# Patient Record
Sex: FEMALE | Race: WHITE
[De-identification: names, ages, dates, MRNs, and addresses within clinical notes are randomized per-mention and may not be internally consistent; named-entity substitution may affect disease eponyms.]

---

## 2017-06-07 ENCOUNTER — HOSPITAL ENCOUNTER (OUTPATIENT)
Dept: HOSPITAL 7 - FB.SDS | Age: 62
Discharge: HOME | End: 2017-06-07
Attending: SURGERY
Payer: COMMERCIAL

## 2017-06-07 VITALS — SYSTOLIC BLOOD PRESSURE: 130 MMHG | DIASTOLIC BLOOD PRESSURE: 84 MMHG

## 2017-06-07 DIAGNOSIS — Z90.710: ICD-10-CM

## 2017-06-07 DIAGNOSIS — E03.9: ICD-10-CM

## 2017-06-07 DIAGNOSIS — Z87.891: ICD-10-CM

## 2017-06-07 DIAGNOSIS — Z98.890: ICD-10-CM

## 2017-06-07 DIAGNOSIS — E78.5: ICD-10-CM

## 2017-06-07 DIAGNOSIS — Z79.899: ICD-10-CM

## 2017-06-07 DIAGNOSIS — K43.9: Primary | ICD-10-CM

## 2017-06-07 PROCEDURE — 49560: CPT

## 2017-06-07 PROCEDURE — C1781 MESH (IMPLANTABLE): HCPCS

## 2017-06-07 NOTE — PCM.OPNOTE
- General Post-Op/Procedure Note


Date of Surgery/Procedure: 06/07/17


Operative Procedure(s): vental hernia repair with mesh


Findings: 





3 cm defect. 


Pre Op Diagnosis: ventral hernia without gangrene or obstruction


Post-Op Diagnosis: Same


Anesthesia Technique: General ET tube, Local (6ml 1% lido with epi/0.5% 

buvipicaine)


Primary Surgeon: Ronnie Valdez


Anesthesia Provider: Heather Holland


Pathology: 





none





Complications: None


Condition: Good


Free Text/Narrative:: 





see dictation

## 2017-06-07 NOTE — OR
DATE OF OPERATION:  06/07/2017

 

SURGEON:  Ronnie Valdez MD

 

PROCEDURE PERFORMED:  Ventral hernia repair.

 

PREOPERATIVE DIAGNOSIS:  Ventral hernia.

 

POSTOPERATIVE DIAGNOSIS:  Ventral hernia.

 

INDICATIONS FOR PROCEDURE:  This is a 62-year-old white female, who has had a

reducible hernia in the epigastrium approximately 4 cm below the xiphoid process

and above the umbilicus.  She was offered and accepted repair.

 

INTRAOPERATIVE FINDINGS:  A 3 cm defect was noted in the midline.  This was

repaired with a Ventralex ST hernia patch, diameter 8 cm, reference #[id number],

lot #[id number], use by 01/28/2019.

 

A 6 mL of 1:1 mixture of 1% lidocaine with epinephrine 0.5% bupivacaine was used

to infiltrate the area.

 

DESCRIPTION OF OPERATION:  After an excellent general anesthetic was

administered, the patient was prepped and draped in the usual sterile manner.  A

6 mL of our mixture was used to infiltrate the area over the planned incision

site.  A 6 cm incision was then carried out using a #10 scalpel blade.

Electrocautery dissection was carried out until the hernia sac was identified

which was then grasped.  Sharp dissection was used to delineate the edges of the

hernia sac.  The hernia sac was then grasped and entered and after reducing the

omentum, the hernia sac was transected and passed off the field.  The

preperitoneal fat was also reduced.  Blunt dissection was carried out reducing

the preperitoneal fat giving us a nice surface for our mesh.  The mesh was then

soaked and inserted, tacked into position with a Stat Tacker and then the defect

was closed with a running 0 Prolene incorporating the fascia.  The subcu fat was

then closed with a running 3-0 Vicryl which was also used to close the skin.

Steri-Strips were then applied.  Needle, sponge, and instrument counts were

reported as correct.  The patient was taken to recovery in good condition.

 

Job#: [id number]

DD: 06/07/2017 1057

DT: 06/07/2017 1525 AS/MODL

## 2018-09-19 ENCOUNTER — HOSPITAL ENCOUNTER (EMERGENCY)
Dept: HOSPITAL 7 - FB.ED | Age: 63
Discharge: HOME | End: 2018-09-19
Payer: COMMERCIAL

## 2018-09-19 VITALS — SYSTOLIC BLOOD PRESSURE: 108 MMHG | DIASTOLIC BLOOD PRESSURE: 72 MMHG

## 2018-09-19 DIAGNOSIS — Z79.899: ICD-10-CM
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
## 2018-09-19 DIAGNOSIS — S61.211A: Primary | ICD-10-CM

## 2018-09-19 NOTE — EDM.PDOC
ED HPI GENERAL MEDICAL PROBLEM





- General


Chief Complaint: Upper Extremity Injury/Pain


Stated Complaint: LT INDEX FINGER LACERATION


Time Seen by Provider: 09/19/18 20:07


Source of Information: Reports: Patient


History Limitations: Reports: No Limitations





- History of Present Illness


INITIAL COMMENTS - FREE TEXT/NARRATIVE: 





Sustained left index finger laceration PTA after ninja  blade grazed 

finger. Tetanus UTD.


Onset: Today


Location: Reports: Upper Extremity, Left


Severity: Mild





- Related Data


 Allergies











Allergy/AdvReac Type Severity Reaction Status Date / Time


 


No Known Allergies Allergy   Verified 06/06/17 08:14











Home Meds: 


 Home Meds





Calcium Carbonate/Vitamin D3 [Calcium 600 + Vit D 200] 1 tab PO BIDMEALS 06/06/ 17 [History]


Levothyroxine 112 mcg PO ACBREAKFAST 06/06/17 [History]


Multivitamin [Multivitamins] 1 tab PO DAILY 06/06/17 [History]


Phentermine HCl 30 mg PO DAILY 06/06/17 [History]


Vit C/Vit E Ac/Lut/Mineral 1 [Prosight with Lutein] 1 each PO DAILY 06/06/17 [

History]


Acetaminophen/HYDROcodone [Norco 325-5 MG] 1 - 2 tab PO Q6H PRN #20 tab 06/07/ 17 [Rx]


Celecoxib [CeleBREX] 200 mg PO BID #14 cap 06/07/17 [Rx]











Past Medical History


HEENT History: Reports: Impaired Vision, Other (See Below)


Other HEENT History: SENSORINEURAL HEARING LOSS, SUBJECTIVE TINNITUS, HX OF 

TORN RETINA


Cardiovascular History: Reports: High Cholesterol


Respiratory History: Reports: None


Gastrointestinal History: Reports: Colon Polyp


Genitourinary History: Reports: None


OB/GYN History: Reports: Pregnancy


Neurological History: Reports: Migraines, Other (See Below)


Other Neuro History: SENSORINEURAL HEARING LOSS, SPINABIFIDA WITH HYDROCEPHALUS


Psychiatric History: Reports: None


Endocrine/Metabolic History: Reports: Hypothyroidism, Obesity/BMI 30+


Hematologic History: Reports: None


Immunologic History: Reports: None


Oncologic (Cancer) History: Reports: None


Dermatologic History: Reports: None





- Infectious Disease History


Infectious Disease History: Reports: Chicken Pox, Measles, Mumps, Rubella





- Past Surgical History


Cardiovascular Surgical History: Reports: None


Respiratory Surgical History: Reports: None


GI Surgical History: Reports: Colonoscopy


Female  Surgical History: Reports: Hysterectomy, Salpingo-Oophorectomy


Other Female  Surgeries/Procedures: VIANCA, BSO


Neurological Surgical History: Reports: None


Musculoskeletal Surgical History: Reports: Other (See Below)


Other Musculoskeletal Surgeries/Procedures:: FOOT SURGERY, BILATERALLY   (LEFT 

ANKLE) ET RIGHT FOOT.  STATES BOTH HAVE 1 SCREW IN.


Dermatological Surgical History: Reports: None





Social & Family History





- Family History


Family Medical History: Noncontributory





- Caffeine Use


Caffeine Use: Reports: None





Review of Systems





- Review of Systems


Review Of Systems: ROS reveals no pertinent complaints other than HPI.





ED EXAM, GENERAL





- Physical Exam


Exam: See Below


Exam Limited By: No Limitations


General Appearance: Alert, WD/WN, No Apparent Distress


Throat/Mouth: No Airway Compromise


Head: Atraumatic, Normocephalic


Neck: Full Range of Motion


Respiratory/Chest: No Respiratory Distress


Peripheral Pulses: 2+: Radial (L)


Extremities: Other (1.5 cm laceration tip of left index finger)


Neurological: Alert, No Motor/Sensory Deficits





ED TRAUMA EXTREMITY PROCEDURES





- Laceration/Wound Repair


  ** Left Digit - 2nd (Index)


Lac/Wound Length In cm: 1.5


Appearance: Subcutaneous


Distal NVT: Neuro & Vascular Intact, No Tendon Injury


Anesthetic Type: Local


Local Anesthesia - Lidocaine (Xylocaine): 1% Plain


Local Anesthetic Volume: 2cc


Skin Prep: Chlorhexidine (Hibiciens)


Exploration/Debridement/Repair: No Foreign Material Found


Closed With: Sutures


Suture Size: 4-0


# of Sutures: 4


Suture Type: Nylon


Tetanus Status Addressed: Yes


Complications: No





Departure





- Departure


Time of Disposition: 20:19


Disposition: Home, Self-Care 01


Condition: Good


Clinical Impression: 


Laceration of finger of left hand


Qualifiers:


 Encounter type: initial encounter Finger: index finger Damage to nail status: 

without damage Foreign body presence: without foreign body Qualified Code(s): 

S61.211A - Laceration without foreign body of left index finger without damage 

to nail, initial encounter








- Discharge Information


*PRESCRIPTION DRUG MONITORING PROGRAM REVIEWED*: No


*COPY OF PRESCRIPTION DRUG MONITORING REPORT IN PATIENT [place]: Not Applicable


Instructions:  Laceration Care, Adult, Stitches, Staples, or Adhesive Wound 

Closure


Referrals: 


Binh Unger MD [Primary Care Provider] - 


Forms:  ED Department Discharge


Additional Instructions: 


Have sutures removed in 7-10 days. Follow up with symptoms or signs of 

infection.

## 2020-05-28 ENCOUNTER — HOSPITAL ENCOUNTER (EMERGENCY)
Dept: HOSPITAL 7 - FB.ED | Age: 65
Discharge: NURSING, REHAB OR LONG-TERM CARE | End: 2020-05-28
Payer: MEDICARE

## 2020-05-28 VITALS — DIASTOLIC BLOOD PRESSURE: 94 MMHG | HEART RATE: 70 BPM | SYSTOLIC BLOOD PRESSURE: 108 MMHG

## 2020-05-28 DIAGNOSIS — E66.9: ICD-10-CM

## 2020-05-28 DIAGNOSIS — I71.01: Primary | ICD-10-CM

## 2020-05-28 DIAGNOSIS — E03.9: ICD-10-CM

## 2020-05-28 PROCEDURE — 85025 COMPLETE CBC W/AUTO DIFF WBC: CPT

## 2020-05-28 PROCEDURE — 71045 X-RAY EXAM CHEST 1 VIEW: CPT

## 2020-05-28 PROCEDURE — 74174 CTA ABD&PLVS W/CONTRAST: CPT

## 2020-05-28 PROCEDURE — 36415 COLL VENOUS BLD VENIPUNCTURE: CPT

## 2020-05-28 PROCEDURE — 83735 ASSAY OF MAGNESIUM: CPT

## 2020-05-28 PROCEDURE — 96375 TX/PRO/DX INJ NEW DRUG ADDON: CPT

## 2020-05-28 PROCEDURE — 99285 EMERGENCY DEPT VISIT HI MDM: CPT

## 2020-05-28 PROCEDURE — 84484 ASSAY OF TROPONIN QUANT: CPT

## 2020-05-28 PROCEDURE — 80053 COMPREHEN METABOLIC PANEL: CPT

## 2020-05-28 PROCEDURE — 96374 THER/PROPH/DIAG INJ IV PUSH: CPT

## 2020-05-28 PROCEDURE — 71275 CT ANGIOGRAPHY CHEST: CPT

## 2020-05-28 PROCEDURE — 93005 ELECTROCARDIOGRAM TRACING: CPT

## 2020-05-28 NOTE — EDM.PDOC
ED HPI GENERAL MEDICAL PROBLEM





- General


Chief Complaint: Chest Pain


Stated Complaint: CHEST PAIN


Time Seen by Provider: 05/28/20 03:55


Source of Information: Reports: Patient


History Limitations: Reports: No Limitations





- History of Present Illness


INITIAL COMMENTS - FREE TEXT/NARRATIVE: 





65-year-old female who reports that she was awakened at about 2 AM with a sharp 

and shooting pain that went across her upper back between her two scapula. She 

sat up and then it seemed to radiate to her upper jaw bilaterally and she 

rolled to cold sweat associated with this. She also developed some pain in her 

central chest associated with this. She also developed headache associated with 

this. She had no arm pain or neck pain. She did have some nausea but had no 

vomiting. She had no shortness of breath. The pain was worse with movement and 

when she took a deep breath. She states that she felt completely well when she 

went to bed. She had no symptoms yesterday. She has never had pain like this 

before. There was no syncope or near syncope. The pain is rated by her as an 8-9

/10. She states that the pain is also worse with lying down. It is a sharp 

pain. There are no other associated signs or symptoms. There are no other 

modifying factors.


Onset: Today (2 AM)


Duration: Constant


Location: Reports: Face (Jaw), Chest, Back


Quality: Reports: Sharp (and shooting)


Severity: Moderate (to severe)


Improves with: Reports: Rest


Worsens with: Reports: Breathing, Other (Lying down), Movement


Context: Reports: Other (As above)


Associated Symptoms: Reports: Chest Pain, Diaphoresis, Headaches, Nausea/

Vomiting


Treatments PTA: Reports: Other Medication(s) (Zofran 4 mg ODT given by EMS staff

)


  ** mid-chest


Pain Score (Numeric/FACES): 8





- Related Data


 Allergies











Allergy/AdvReac Type Severity Reaction Status Date / Time


 


No Known Allergies Allergy   Verified 05/28/20 03:58











Home Meds: 


 Home Meds





Levothyroxine 112 mcg PO ACBREAKFAST 06/06/17 [History]


Multivitamin [Multivitamins] 1 tab PO DAILY 06/06/17 [History]


Phentermine HCl 30 mg PO DAILY 06/06/17 [History]











Past Medical History


HEENT History: Reports: Impaired Vision, Other (See Below)


Other HEENT History: SENSORINEURAL HEARING LOSS, SUBJECTIVE TINNITUS, HX OF 

TORN RETINA


Cardiovascular History: Reports: High Cholesterol


Gastrointestinal History: Reports: Colon Polyp


Neurological History: Reports: Migraines, Other (See Below)


Other Neuro History: SENSORINEURAL HEARING LOSS, SPINABIFIDA WITH HYDROCEPHALUS


Endocrine/Metabolic History: Reports: Hypothyroidism, Obesity/BMI 30+





- Infectious Disease History


Infectious Disease History: Reports: Chicken Pox, Measles, Mumps, Rubella





- Past Surgical History


GI Surgical History: Reports: Colonoscopy


Female  Surgical History: Reports: Hysterectomy, Salpingo-Oophorectomy


Other Female  Surgeries/Procedures: VIANCA, BSO


Musculoskeletal Surgical History: Reports: Other (See Below) (Bilateral feet 

surgery)





Social & Family History





- Family History


Cardiac: Reports: CAD, Other (See Below) (Aneurysms)





- Tobacco Use


Smoking Status *Q: Former Smoker (Quit in 1986.)





- Caffeine Use


Caffeine Use: Reports: None





- Alcohol Use


Alcohol Use History: Yes


Alcohol Use Frequency: Rarely (About 2 times per year)





- Living Situation & Occupation


Occupation: Retired





ED ROS GENERAL





- Review of Systems


Review Of Systems: See Below


Constitutional: Reports: Diaphoresis


HEENT: Reports: No Symptoms


Respiratory: Reports: No Symptoms


Cardiovascular: Reports: Chest Pain


Endocrine: Reports: No Symptoms


GI/Abdominal: Reports: Nausea.  Denies: Abdominal Pain


: Reports: No Symptoms


Musculoskeletal: Reports: Back Pain (Upper back pain)


Skin: Reports: Diaphoresis


Neurological: Reports: No Symptoms


Hematologic/Lymphatic: Reports: No Symptoms


Immunologic: Reports: No Symptoms





ED EXAM, GENERAL





- Physical Exam


Exam: See Below


Exam Limited By: No Limitations


General Appearance: Alert, Moderate Distress, Obese


Eye Exam: Bilateral Eye: EOMI, Normal Inspection (Sclera are anicteric), PERRL


Ears: Normal External Exam, Hearing Grossly Normal


Ear Exam: Bilateral Ear: Auricle Normal


Nose: Normal Inspection, Normal Mucosa, No Blood


Throat/Mouth: Normal Inspection, Normal Oropharynx, Normal Voice, No Airway 

Compromise


Head: Atraumatic, Normocephalic


Neck: Normal Inspection, Supple, Non-Tender, Full Range of Motion


Respiratory/Chest: No Respiratory Distress, Lungs Clear, Normal Breath Sounds, 

No Accessory Muscle Use, Chest Non-Tender


Cardiovascular: Normal Peripheral Pulses, Regular Rate, Rhythm, No Murmur


Peripheral Pulses: 2+: Radial (L), Radial (R), Dorsalis Pedis (L), Dorsalis 

Pedis (R)


GI/Abdominal: Normal Bowel Sounds, Soft, Non-Tender, Other (Protuberant)


Back Exam: Normal Inspection, Full Range of Motion


Extremities: Normal Inspection, Normal Range of Motion, Non-Tender, Normal 

Capillary Refill


Neurological: Alert, Oriented, CN II-XII Intact, Normal Cognition, No Motor/

Sensory Deficits


Psychiatric: Anxious


Skin Exam: Intact, Normal Color, Diaphoretic, Other (Flushed face)





EKG INTERPRETATION


EKG Date: 05/28/20


Time: 03:56


Rhythm: NSR


Rate (Beats/Min): 69


Axis: LAD-Left Axis Deviation (Slight left axis)


P-Wave: Present


QRS: Normal


ST-T: Normal


QT: Normal


VA/PQ Interval: Abnormal R-wave progression


Comparison: NA - No Prior EKG (No current of injury or ischemia is present.)





Course





- Vital Signs


Last Recorded V/S: 


 Last Vital Signs











Temp  36.4 C   05/28/20 03:40


 


Pulse  70   05/28/20 05:35


 


Resp  19   05/28/20 05:35


 


BP  108/94 H  05/28/20 05:35


 


Pulse Ox  96   05/28/20 05:35














- Orders/Labs/Meds


Orders: 


 Active Orders 24 hr











 Category Date Time Status


 


 EKG Documentation Completion [RC] ASDIRECTED Care  05/28/20 04:10 Active


 


 Ang Chest [CT] Stat Exams  05/28/20 04:43 Ordered


 


 CTA Abd Pelv w Cont [CT] Stat Exams  05/28/20 04:43 Ordered


 


 Chest 1V Frontal [CR] Stat Exams  05/28/20 04:09 Taken


 


 Sodium Chloride 0.9% [Normal Saline] 1,000 ml Med  05/28/20 04:15 Active





 IV ASDIRECTED   


 


 Sodium Chloride 0.9% [Saline Flush] Med  05/28/20 04:09 Active





 10 ml FLUSH ASDIRECTED PRN   


 


 Peripheral IV Insertion Adult [OM.PC] Routine Oth  05/28/20 04:09 Ordered


 


 EKG 12 Lead [EK] Routine Ther  05/28/20 04:09 Ordered








 Medication Orders





Sodium Chloride (Normal Saline)  1,000 mls @ 100 mls/hr IV ASDIRECTED HARJINDER


   Last Admin: 05/28/20 04:28  Dose: 100 mls/hr


Sodium Chloride (Saline Flush)  10 ml FLUSH ASDIRECTED PRN


   PRN Reason: Keep Vein Open








Labs: 


 Laboratory Tests











  05/28/20 05/28/20 05/28/20 Range/Units





  03:55 03:55 03:55 


 


WBC  8.4    (4.5-12.0)  X10-3/uL


 


RBC  5.06    (3.23-5.20)  x10(6)uL


 


Hgb  14.1    (11.5-15.5)  g/dL


 


Hct  44.6    (30.0-51.3)  %


 


MCV  88.0    (80-96)  fL


 


MCH  27.9    (27.7-33.6)  pg


 


MCHC  31.7 L    (32.2-35.4)  g/dL


 


RDW  12.4    (11.5-15.5)  %


 


Plt Count  234    (125-369)  X10(3)uL


 


MPV  8.8    (7.4-10.4)  fL


 


Neut % (Auto)  56.1    (46-82)  %


 


Lymph % (Auto)  32.7    (13-37)  %


 


Mono % (Auto)  9.1    (4-12)  %


 


Eos % (Auto)  2    (1.0-5.0)  %


 


Baso % (Auto)  0    (0-2)  %


 


Neut # (Auto)  4.7    (1.6-8.3)  #


 


Lymph # (Auto)  2.7    (0.6-5.0)  #


 


Mono # (Auto)  0.8    (0.0-1.3)  #


 


Eos # (Auto)  0.2    (0.0-0.8)  #


 


Baso # (Auto)  0.0    (0.0-0.2)  #


 


Sodium   145   (135-145)  mmol/L


 


Potassium   3.4 L   (3.5-5.3)  mmol/L


 


Chloride   105   (100-110)  mmol/L


 


Carbon Dioxide   30   (21-32)  mmol/L


 


BUN   20 H   (7-18)  mg/dL


 


Creatinine   0.9   (0.55-1.02)  mg/dL


 


Est Cr Clr Drug Dosing   60.60   mL/min


 


Estimated GFR (MDRD)   > 60   (>60)  


 


BUN/Creatinine Ratio   22.2 H   (9-20)  


 


Glucose   122 H   ()  mg/dL


 


Calcium   10.1   (8.6-10.2)  mg/dL


 


Magnesium   1.8   (1.8-2.5)  mg/dL


 


Total Bilirubin   0.5   (0.1-1.3)  mg/dL


 


AST   17   (5-25)  IU/L


 


ALT   24   (12-36)  U/L


 


Alkaline Phosphatase   80   ()  IU/L


 


Troponin I    5.2  (4.0-60.3)  pg/mL


 


Total Protein   7.9   (6.0-8.0)  g/dL


 


Albumin   4.0   (3.2-4.6)  g/dL


 


Globulin   3.9   g/dL


 


Albumin/Globulin Ratio   1.0   











Meds: 


Medications











Generic Name Dose Route Start Last Admin





  Trade Name Freq  PRN Reason Stop Dose Admin


 


Sodium Chloride  1,000 mls @ 100 mls/hr  05/28/20 04:15  05/28/20 04:28





  Normal Saline  IV   100 mls/hr





  ASDIRECTED HARJINDER   Administration





     





     





     





     


 


Sodium Chloride  10 ml  05/28/20 04:09  





  Saline Flush  FLUSH   





  ASDIRECTED PRN   





  Keep Vein Open   





     





     





     














Discontinued Medications














Generic Name Dose Route Start Last Admin





  Trade Name Freq  PRN Reason Stop Dose Admin


 


Aspirin  324 mg  05/28/20 03:40  05/28/20 04:03





  Aspirin  PO  05/28/20 03:41  324 mg





  ONETIME ONE   Administration





     





     





     





     


 


Iopamidol  100 ml  05/28/20 05:03  05/28/20 05:18





  Isovue-370 (76%)  IV  05/28/20 05:04  100 ml





  .AS DIRECTED ONE   Administration





     





     





     





     


 


Morphine Sulfate  4 mg  05/28/20 04:10  05/28/20 04:26





  Morphine  IVPUSH  05/28/20 04:11  4 mg





  ONETIME ONE   Administration





     





     





     





     


 


Morphine Sulfate  4 mg  05/28/20 04:49  05/28/20 04:54





  Morphine  IVPUSH  05/28/20 04:50  4 mg





  ONETIME ONE   Administration





     





     





     





     


 


Ondansetron HCl  4 mg  05/28/20 04:10  05/28/20 04:26





  Zofran  IVPUSH  05/28/20 04:11  4 mg





  ONETIME ONE   Administration





     





     





     





     














- Radiology Interpretation


Free Text/Narrative:: 





Portable chest x-ray shows haziness of the left CPA.





CTA of the chest, abdomen and pelvis showed a type 1 aortic dissection.





- Re-Assessments/Exams


Free Text/Narrative Re-Assessment/Exam: 





05/28/20 04:40: Patient has received morphine 4 mg IV with slight relief in her 

pain. Her diaphoresis is resolving. She more calm. Her blood pressure remained 

stable. Her initial troponin was normal. Her chest x-ray does show maybe some 

haziness in the left CPA. Her EKG shows no injury pattern or pattern of 

ischemia. Because of her sharp back and chest pain, I am concerned about 

dissection and I will send the patient for a CTA of her chest, abdomen and 

pelvis. We will continue to monitor the patient closely.





05/28/20 05:30: I reviewed the patient's CTA of the chest, abdomen and pelvis 

and there was evidence of a type 1 aortic dissection. The patient has remained 

hemodynamically stable with a blood pressure ranging from the 110-150 systolic 

range. Her pulse rate has remained in the 80s and 90s. His saturations dipped 

into the low 90s and we placed her on supplemental oxygen at 2 L/m via nasal 

cannula with O2 saturations which came up to the 95% range or greater. Her pain 

is currently at a level 6/10 after two 4 mg aliquots of morphine IV. I 

discussed this with the patient and she will need cardiothoracic specially 

services which are not available at [facility]. She would prefer 

that I discussed her case with the doctors at [facility] in [place].





05/28/20 05:45: I have discussed the patient's case with Dr. Mg, 

cardiothoracic surgeon at [facility] in [place], and she has agreed to accept the 

patient in transfer. She has no other recommendations for the patient's care at 

this point other than close monitoring and transport to her facility for 

ongoing cares. She will be transferred via [facility] ground ambulance service as this 

is the most expeditious transport method. The patient is in agreement with this 

plan for transfer. I called and discussed this with the patient's daughter at 

the patient's request.





05/28/20 06:05: Ambulance service has arrived and the patient will be readied 

for transport and transported shortly. The patient remains hemodynamically 

stable with a blood pressure of 140 systolic most recently.











Departure





- Departure


Time of Disposition: 06:10


Disposition: DC/Tfer to Acute Hospital [facility]


Reason for Transfer *Q: Other (Patient with type "A' aortic dissection)


Condition: Critical


Clinical Impression: 


 Type 1 dissection of ascending aorta





Referrals: 


Binh Unger MD [Primary Care Provider] - 


Forms:  ED Department Discharge





Critical Care Note





- Critical Care Note


Total Time (mins): 130


Comments: 





Patient was attended continuously and closely monitored by myself throughout 

her emergency department stay secondary to the continuing chest pain, 

diaphoresis and concern for aortic dissection with total critical care time 

being 130 minutes.





Sepsis Event Note





- Focused Exam


Vital Signs: 


 Vital Signs











  Temp Pulse Resp BP Pulse Ox


 


 05/28/20 05:35   70  19  108/94 H  96


 


 05/28/20 05:15   72  17  143/55 H  95


 


 05/28/20 04:45   68  17  116/52 L  95


 


 05/28/20 04:15   71  18  128/47 L  98


 


 05/28/20 04:00   69  18  127/54 L  97


 


 05/28/20 03:45   66  17  105/92 H  98


 


 05/28/20 03:40  36.4 C  64  19  107/69  98











Date Exam was Performed: 05/28/20


Time Exam was Performed: 06:14





- My Orders


Last 24 Hours: 


My Active Orders





05/28/20 04:09


Chest 1V Frontal [CR] Stat 


Sodium Chloride 0.9% [Saline Flush]   10 ml FLUSH ASDIRECTED PRN 


Peripheral IV Insertion Adult [OM.PC] Routine 


EKG 12 Lead [EK] Routine 





05/28/20 04:10


EKG Documentation Completion [RC] ASDIRECTED 





05/28/20 04:15


Sodium Chloride 0.9% [Normal Saline] 1,000 ml IV ASDIRECTED 





05/28/20 04:43


Ang Chest [CT] Stat 


CTA Abd Pelv w Cont [CT] Stat 














- Assessment/Plan


Last 24 Hours: 


My Active Orders





05/28/20 04:09


Chest 1V Frontal [CR] Stat 


Sodium Chloride 0.9% [Saline Flush]   10 ml FLUSH ASDIRECTED PRN 


Peripheral IV Insertion Adult [OM.PC] Routine 


EKG 12 Lead [EK] Routine 





05/28/20 04:10


EKG Documentation Completion [RC] ASDIRECTED 





05/28/20 04:15


Sodium Chloride 0.9% [Normal Saline] 1,000 ml IV ASDIRECTED 





05/28/20 04:43


Ang Chest [CT] Stat 


CTA Abd Pelv w Cont [CT] Stat

## 2020-05-28 NOTE — CR
INDICATION: Chest and back pain. 



CHEST, 1 VIEW:  AP upright view of the chest was obtained 05/28/20 - no 
comparison. 



The heart appeared to be at the upper limits of normal in size.  



The aortic arch is prominent with a double shadow.  The ascending aorta is 
tortuous.  Widening of the mediastinum superiorly is most likely due to 
brachiocephalic vessel prominence.  



A definite active infiltrate or effusion was not identified. 



Overlying EKG leads are noted.  



IMPRESSION:  Double shadow at the aortic arch with a prominent tortuous aorta, 
etiology indeterminate. In light of the patient's symptoms, CT angiography of 
the chest and abdomen may be helpful.  
MTDD

## 2022-05-06 ENCOUNTER — HOSPITAL ENCOUNTER (INPATIENT)
Dept: HOSPITAL 7 - FB.MS | Age: 67
LOS: 6 days | Discharge: HOME HEALTH | DRG: 948 | End: 2022-05-12
Attending: FAMILY MEDICINE | Admitting: FAMILY MEDICINE
Payer: MEDICARE

## 2022-05-06 DIAGNOSIS — Z98.890: ICD-10-CM

## 2022-05-06 DIAGNOSIS — E66.9: ICD-10-CM

## 2022-05-06 DIAGNOSIS — Z79.890: ICD-10-CM

## 2022-05-06 DIAGNOSIS — E78.00: ICD-10-CM

## 2022-05-06 DIAGNOSIS — Z87.891: ICD-10-CM

## 2022-05-06 DIAGNOSIS — Z79.82: ICD-10-CM

## 2022-05-06 DIAGNOSIS — G47.33: ICD-10-CM

## 2022-05-06 DIAGNOSIS — H54.7: ICD-10-CM

## 2022-05-06 DIAGNOSIS — Z79.899: ICD-10-CM

## 2022-05-06 DIAGNOSIS — Z90.710: ICD-10-CM

## 2022-05-06 DIAGNOSIS — I10: ICD-10-CM

## 2022-05-06 DIAGNOSIS — E03.9: ICD-10-CM

## 2022-05-06 DIAGNOSIS — R53.81: Primary | ICD-10-CM

## 2022-05-06 RX ADMIN — Medication SCH EACH: at 20:15

## 2022-05-06 RX ADMIN — ACETAMINOPHEN, DIPHENHYDRAMINE HYDROCHLORIDE PRN TAB: 500; 25 TABLET, FILM COATED ORAL at 21:11

## 2022-05-07 RX ADMIN — ACETAMINOPHEN, DIPHENHYDRAMINE HYDROCHLORIDE PRN TAB: 500; 25 TABLET, FILM COATED ORAL at 21:24

## 2022-05-07 RX ADMIN — CALCIUM SCH MG: 500 TABLET ORAL at 08:44

## 2022-05-07 RX ADMIN — Medication SCH EACH: at 20:11

## 2022-05-07 RX ADMIN — ASPIRIN SCH MG: 325 TABLET, DELAYED RELEASE ORAL at 08:44

## 2022-05-07 RX ADMIN — Medication SCH EACH: at 08:45

## 2022-05-08 RX ADMIN — CALCIUM SCH MG: 500 TABLET ORAL at 09:09

## 2022-05-08 RX ADMIN — Medication SCH EACH: at 20:17

## 2022-05-08 RX ADMIN — Medication SCH EACH: at 09:09

## 2022-05-08 RX ADMIN — ACETAMINOPHEN, DIPHENHYDRAMINE HYDROCHLORIDE PRN TAB: 500; 25 TABLET, FILM COATED ORAL at 21:40

## 2022-05-08 RX ADMIN — ASPIRIN SCH MG: 325 TABLET, DELAYED RELEASE ORAL at 09:09

## 2022-05-09 RX ADMIN — CALCIUM SCH MG: 500 TABLET ORAL at 09:00

## 2022-05-09 RX ADMIN — Medication SCH EACH: at 20:22

## 2022-05-09 RX ADMIN — ASPIRIN SCH MG: 325 TABLET, DELAYED RELEASE ORAL at 09:00

## 2022-05-09 RX ADMIN — Medication SCH EACH: at 09:00

## 2022-05-10 RX ADMIN — CALCIUM SCH MG: 500 TABLET ORAL at 09:52

## 2022-05-10 RX ADMIN — Medication SCH EACH: at 21:26

## 2022-05-10 RX ADMIN — ASPIRIN SCH MG: 325 TABLET, DELAYED RELEASE ORAL at 09:53

## 2022-05-10 RX ADMIN — Medication SCH EACH: at 09:53

## 2022-05-11 RX ADMIN — Medication SCH EACH: at 08:19

## 2022-05-11 RX ADMIN — Medication SCH EACH: at 20:48

## 2022-05-11 RX ADMIN — ASPIRIN SCH MG: 325 TABLET, DELAYED RELEASE ORAL at 08:19

## 2022-05-11 RX ADMIN — CALCIUM SCH MG: 500 TABLET ORAL at 08:19

## 2022-05-12 VITALS — DIASTOLIC BLOOD PRESSURE: 63 MMHG | HEART RATE: 81 BPM | SYSTOLIC BLOOD PRESSURE: 124 MMHG

## 2022-05-12 RX ADMIN — ASPIRIN SCH MG: 325 TABLET, DELAYED RELEASE ORAL at 08:37

## 2022-05-12 RX ADMIN — CALCIUM SCH MG: 500 TABLET ORAL at 08:37

## 2022-05-12 RX ADMIN — Medication SCH EACH: at 08:38

## 2023-08-21 ENCOUNTER — HOSPITAL ENCOUNTER (OUTPATIENT)
Dept: HOSPITAL 7 - FB.SDS | Age: 68
Discharge: HOME | End: 2023-08-21
Attending: SURGERY
Payer: MEDICARE

## 2023-08-21 VITALS — DIASTOLIC BLOOD PRESSURE: 85 MMHG | HEART RATE: 84 BPM | SYSTOLIC BLOOD PRESSURE: 149 MMHG

## 2023-08-21 DIAGNOSIS — I35.0: ICD-10-CM

## 2023-08-21 DIAGNOSIS — Z12.11: Primary | ICD-10-CM

## 2023-08-21 DIAGNOSIS — E66.9: ICD-10-CM

## 2023-08-21 DIAGNOSIS — D12.6: ICD-10-CM

## 2023-08-21 DIAGNOSIS — Z79.82: ICD-10-CM

## 2023-08-21 DIAGNOSIS — H52.209: ICD-10-CM

## 2023-08-21 DIAGNOSIS — K57.30: ICD-10-CM

## 2023-08-21 DIAGNOSIS — Z79.899: ICD-10-CM

## 2023-08-21 DIAGNOSIS — H91.90: ICD-10-CM

## 2023-08-21 DIAGNOSIS — G47.33: ICD-10-CM

## 2023-08-21 DIAGNOSIS — Z87.891: ICD-10-CM

## 2023-08-21 DIAGNOSIS — J90: ICD-10-CM

## 2023-08-21 DIAGNOSIS — Z86.010: ICD-10-CM
